# Patient Record
(demographics unavailable — no encounter records)

---

## 2024-10-15 NOTE — REASON FOR VISIT
[Follow-Up] : a follow-up visit [Cough] : cough [COPD] : COPD [Pulmonary Fibrosis] : pulmonary fibrosis [Shortness of Breath] : shortness of breath [TextEntry] : Patient states she has been experiencing an increase in her SOB with minimal activity, sha also has a persistent cough.  She went to a throat specialist, Dr Beckwith.

## 2024-10-15 NOTE — PHYSICAL EXAM
[No Acute Distress] : no acute distress [Normal Oropharynx] : normal oropharynx [Normal Appearance] : normal appearance [No Neck Mass] : no neck mass [Normal Rate/Rhythm] : normal rate/rhythm [Normal S1, S2] : normal s1, s2 [No Murmurs] : no murmurs [No Resp Distress] : no resp distress [Rales] : rales [Rhonchi] : rhonchi [No Abnormalities] : no abnormalities [Benign] : benign [Normal Gait] : normal gait [No Clubbing] : no clubbing [No Cyanosis] : no cyanosis [No Edema] : no edema [FROM] : FROM [Normal Color/ Pigmentation] : normal color/ pigmentation [No Focal Deficits] : no focal deficits [Oriented x3] : oriented x3 [Normal Affect] : normal affect [TextBox_68] : Coarse rhonchi rales diffusely all areas

## 2024-10-15 NOTE — HISTORY OF PRESENT ILLNESS
[Former] : former [Never] : never [Continuous] : Continuous [NC] : Nasal Cannula [Nightly] : Nightly [TextBox_4] : 86 female hx copd and ipf pt co inc  sob  sl cough minimal phlegm cough am and pm with eating liquid and food  seen by ent and told abn in throat  and  epiglottis doesnt close  to try speech therapy  or possible botox injection    [TextBox_11] : 2 [TextBox_13] : 37 [YearQuit] : 1989 [FreeTextEntry1] : 2 [FreeTextEntry3] : as needed during the day

## 2024-10-15 NOTE — DISCUSSION/SUMMARY
[FreeTextEntry1] : Ms. Morfin has a combination of COPD and fibrosis and interstitial lung disease.  The patient is overall quite frail.  She is on full therapy for the COPD including Symbicort and Spiriva.  She was also having chronic aspiration which certainly could be worsening her interstitial disease.  She is going to be seeing speech therapy to hopefully correct this problem.  We will continue all therapy.  Will try adding theophylline 300 mg once a day to see if it improves her lung function and exercise abilities. The patient understands and agrees with the plan of care. Today's office visit encompassed 42 minutes. I conducted an extensive history, physical exam and reviewed diagnosis and treatment options including diagnostic tests radiology studies including cat scans and the use of prescription medication.

## 2024-10-15 NOTE — PROCEDURE
[FreeTextEntry1] : Spirometry performed 10/15/2024.  Mild restrictive disease.  Moderate obstructive disease.  Severe decreased diffusion capacity consistent with emphysema and fibrosis.  Decreased in FEV1 by 30% FVC 20% DLCO 18% from December 2020.

## 2024-12-09 NOTE — PROCEDURE
[FreeTextEntry1] : Pulmonary function test performed on 12/9/2024 moderate restrictive disease with a severe interstitial component versus emphysema.  Moderate obstructive disease of the small airways.

## 2024-12-09 NOTE — REASON FOR VISIT
[Follow-Up] : a follow-up visit [COPD] : COPD [TextEntry] : Pt here for 6 week follow-up PFT done today. Received the Theophylline hasn't tried it yet not sure what it's for (as stated by PT).

## 2024-12-09 NOTE — PHYSICAL EXAM
[No Acute Distress] : no acute distress [Normal Oropharynx] : normal oropharynx [Normal Appearance] : normal appearance [No Neck Mass] : no neck mass [Normal Rate/Rhythm] : normal rate/rhythm [Normal S1, S2] : normal s1, s2 [No Murmurs] : no murmurs [No Resp Distress] : no resp distress [Rales] : rales [No Abnormalities] : no abnormalities [Benign] : benign [Normal Gait] : normal gait [No Clubbing] : no clubbing [No Cyanosis] : no cyanosis [No Edema] : no edema [FROM] : FROM [Normal Color/ Pigmentation] : normal color/ pigmentation [No Focal Deficits] : no focal deficits [Oriented x3] : oriented x3 [Normal Affect] : normal affect [TextBox_2] : Elderly female no short of breath [TextBox_68] : Chiki alarcon

## 2024-12-09 NOTE — DISCUSSION/SUMMARY
[FreeTextEntry1] : Ms. Morfin has severe emphysema and interstitial lung disease.  She is on full therapy for the COPD.  There is little to offer as far as interstitial lung disease and looking at her CAT scan there is a severe emphysema component.  I favor continuing all therapy.  The patient will continue using her oxygen with exercise.  And sleep. The patient understands and agrees with plan of care. Today's office visit encompassed 32 minutes which excludes teaching and separately reported services.. I conducted an extensive history, physical exam and reviewed diagnosis and treatment options including diagnostic tests,radiology studies including cat scans and the use of prescription medication.

## 2024-12-09 NOTE — HISTORY OF PRESENT ILLNESS
[Former] : former [Never] : never [TextBox_4] : 86 female hx copd and IPF tried theophylline and feels much better but after 2 weeks less effect  breathing difficult poor activity overall  no fever no phlegm  no chest pain no tightness some relief with nebulizer   albuterol using o2 at home with activity  co nasal drying [TextBox_11] : 2 [TextBox_13] : 37 [YearQuit] : 1989

## 2025-06-10 NOTE — REASON FOR VISIT
[Follow-Up] : a follow-up visit [COPD] : COPD [Pulmonary Fibrosis] : pulmonary fibrosis [Shortness of Breath] : shortness of breath [TextEntry] : Patient here for 6 month f/u. SOB this time of year especially during high humidity. Been using Symbicort everyday. Been coughing quite a bit due to the result of stroke Blood clot found on left leg early this year.

## 2025-06-10 NOTE — PHYSICAL EXAM
[No Acute Distress] : no acute distress [Normal Oropharynx] : normal oropharynx [Normal Appearance] : normal appearance [No Neck Mass] : no neck mass [Normal Rate/Rhythm] : normal rate/rhythm [Normal S1, S2] : normal s1, s2 [No Murmurs] : no murmurs [No Resp Distress] : no resp distress [No Abnormalities] : no abnormalities [Benign] : benign [Normal Gait] : normal gait [No Clubbing] : no clubbing [No Cyanosis] : no cyanosis [No Edema] : no edema [FROM] : FROM [Normal Color/ Pigmentation] : normal color/ pigmentation [No Focal Deficits] : no focal deficits [Oriented x3] : oriented x3 [Normal Affect] : normal affect [TextBox_2] : Elderly frail patient no distress [TextBox_68] : Coarse Rales one half bilateral thorax

## 2025-06-10 NOTE — DISCUSSION/SUMMARY
[FreeTextEntry1] :  Ms Morfin has COPD and pulmonary fibrosis.  I feel the pulmonary fibrosis is a major component.  Once again I discussed the lack of therapy in the treatment with antifibrinolytic's.  In this frail 87-year-old patient I do not think the side effects are worth the benefits she understands and agrees.  Will continue all therapy at this time. The patient understands and agrees with plan of care. Today's office visit encompassed 32 minutes which excludes teaching and separately reported services.. I conducted an extensive history, physical exam and reviewed diagnosis and treatment options including diagnostic tests,radiology studies including cat scans and the use of prescription medication.

## 2025-06-10 NOTE — HISTORY OF PRESENT ILLNESS
[Former] : former [Never] : never [Intermittent] : Intermittent [NC] : Nasal Cannula [Nightly] : Nightly [TextBox_4] : 87 female hx copd and ipf feels fair breathing feels sl worse less exercise  difficult with stairs  occ cough no phlegm  minimal wheeze with stairs  recent dvt 2 month ago and now on eliquis use o2 nc at nite  use with exercise  during day [TextBox_11] : 2 [TextBox_13] : 37 [YearQuit] : 1989 [FreeTextEntry1] : 2